# Patient Record
Sex: FEMALE
[De-identification: names, ages, dates, MRNs, and addresses within clinical notes are randomized per-mention and may not be internally consistent; named-entity substitution may affect disease eponyms.]

---

## 2018-06-20 ENCOUNTER — RESULT REVIEW (OUTPATIENT)
Age: 28
End: 2018-06-20

## 2020-10-23 PROBLEM — Z00.00 ENCOUNTER FOR PREVENTIVE HEALTH EXAMINATION: Status: ACTIVE | Noted: 2020-10-23

## 2020-10-26 ENCOUNTER — APPOINTMENT (OUTPATIENT)
Dept: ORTHOPEDIC SURGERY | Facility: CLINIC | Age: 30
End: 2020-10-26
Payer: MEDICAID

## 2020-10-26 VITALS — TEMPERATURE: 94.4 F

## 2020-10-26 DIAGNOSIS — S42.302A UNSPECIFIED FRACTURE OF SHAFT OF HUMERUS, LEFT ARM, INITIAL ENCOUNTER FOR CLOSED FRACTURE: ICD-10-CM

## 2020-10-26 PROCEDURE — 24500 CLTX HUMRL SHFT FX W/O MNPJ: CPT | Mod: LT

## 2020-10-26 PROCEDURE — 73060 X-RAY EXAM OF HUMERUS: CPT | Mod: LT

## 2020-10-26 PROCEDURE — 99204 OFFICE O/P NEW MOD 45 MIN: CPT | Mod: 57

## 2020-10-26 PROCEDURE — 99072 ADDL SUPL MATRL&STAF TM PHE: CPT

## 2020-10-26 NOTE — HISTORY OF PRESENT ILLNESS
[de-identified] : 30 year old Macedonian speaking female presenting with injury to left arm 4 weeks ago. Patient states she tripped at home, falling onto the injured side. She was seen in the ED and diagnosed with a humerus fracture. She was also provided a veloz brace. She denies any numbness or tingling in the LUE and previously had arm swelling, which has resolved. She denies any recent fever/chills, or any other complaints/injuries.  [Bending] : worsened by bending [Lifting] : worsened by lifting [Recumbency] : relieved by recumbency [Rest] : relieved by rest

## 2020-10-26 NOTE — REVIEW OF SYSTEMS
[Joint Pain] : joint pain [Joint Stiffness] : no joint stiffness [Joint Swelling] : no joint swelling [Discharge] : no discharge [Eye Pain] : no eye pain [Redness] : eyes not red [Nasal Discharge] : no nasal discharge [Nosebleeds] : no nosebleeds [Sore Throat] : no sore throat [SOB at rest] : no shortness of breath at rest [Cough] : no cough [Wheezing] : no wheezing [Headache] : no headache [Dizziness] : no dizziness [Fainting] : no fainting [Feeling Weak] : not feeling weak [Muscle Weakness] : no muscle weakness [Easy Bleeding] : no tendency for easy bleeding [Easy Bruising] : no tendency for easy bruising [Negative] : Heme/Lymph

## 2020-10-26 NOTE — DISCUSSION/SUMMARY
[de-identified] : 30F w/ left midshaft humerus fracture\par \par Fracture is well aligned in the veloz brace\par Will continue w/ nonoperative management\par Veloz brace wear 23 hours/day\par Pendulum exercises and elbow ROM at home\par NWB LUE\par Note provided for work\par Patient will RTC in 1 month for repeat imaging and examination, with anticipated advancement to physical therapy\par Patient verbalized understanding and agreement with the above\par \par Orthopaedic Trauma Surgeon Addendum:\par \par I agree with the above resident physician note.  \par Chart was reviewed and patient examined in the orthopedic office. I agree with the assessment and plan of the resident.\par \par I was physically present for the key portions of the evaluation and management service provided. I agree with the above history, physical and plan. Appropriate imaging has been reviewed and the plan adjusted as needed.\par \par Indra Mcqueen MD\par Orthopaedic Trauma Surgeon\par [facility]\par [facility] [place]

## 2020-10-26 NOTE — PHYSICAL EXAM
[de-identified] : Physical Exam:\par General: Well appearing, no acute distress, A&O\par Neurologic: A&Ox3, No focal deficits\par Head: NCAT without abrasions, lacerations, or ecchymosis to head, face, or scalp\par Respiratory: Equal chest wall expansion bilaterally, no accessory muscle use\par Lymphatic: No lymphadenopathy palpated\par Skin: Warm and dry\par Psychiatric: Normal mood and affect\par \par LUE:\par Humerus in veloz brace\par No palpable forearm/hand edema, no ecchymosis or erythema\par Mild pain with ROM\par +AIN/PIN/Radial/Median/Musc\par SILT C5-T1\par +Radial pulse\par Soft compartments\par  [de-identified] : AP and lateral radiographs of the left humerus performed today demonstrate a midshaft humerus fracture in acceptable alignment with evidence of minimal callus formation

## 2020-10-26 NOTE — REASON FOR VISIT
[Initial Visit] : an initial visit for [Other: ____] : [unfilled] [ Service] : provided by  Service [Humerus Fracture] : humerus fracture [FreeTextEntry1] : [id number] [FreeTextEntry2] : Ashley

## 2020-11-30 ENCOUNTER — APPOINTMENT (OUTPATIENT)
Dept: ORTHOPEDIC SURGERY | Facility: CLINIC | Age: 30
End: 2020-11-30